# Patient Record
Sex: FEMALE | Employment: FULL TIME | URBAN - METROPOLITAN AREA
[De-identification: names, ages, dates, MRNs, and addresses within clinical notes are randomized per-mention and may not be internally consistent; named-entity substitution may affect disease eponyms.]

---

## 2024-07-22 ENCOUNTER — OFFICE VISIT (OUTPATIENT)
Age: 24
End: 2024-07-22

## 2024-07-22 VITALS
SYSTOLIC BLOOD PRESSURE: 108 MMHG | OXYGEN SATURATION: 100 % | HEART RATE: 100 BPM | TEMPERATURE: 100.5 F | DIASTOLIC BLOOD PRESSURE: 69 MMHG | WEIGHT: 128 LBS

## 2024-07-22 DIAGNOSIS — K04.7 DENTAL ABSCESS: Primary | ICD-10-CM

## 2024-07-22 DIAGNOSIS — R50.9 FEVER, UNSPECIFIED FEVER CAUSE: ICD-10-CM

## 2024-07-22 LAB
GROUP A STREP ANTIGEN, POC: NEGATIVE
Lab: NORMAL
PERFORMING INSTRUMENT: NORMAL
QC PASS/FAIL: NORMAL
SARS-COV-2, POC: NORMAL
VALID INTERNAL CONTROL, POC: YES

## 2024-07-22 RX ORDER — CEFDINIR 300 MG/1
300 CAPSULE ORAL 2 TIMES DAILY
Qty: 14 CAPSULE | Refills: 0 | Status: SHIPPED | OUTPATIENT
Start: 2024-07-22 | End: 2024-07-29

## 2024-07-22 RX ORDER — COPPER 313.4 MG/1
1 INTRAUTERINE DEVICE INTRAUTERINE ONCE
COMMUNITY

## 2024-07-22 NOTE — PATIENT INSTRUCTIONS
Patient was seen today for sore throat, fevers, chills, dental pain and lymph node swelling  Negative strep and COVID test in clinic today  I do see some evidence of possible dental abscess on the right lower jaw  Will treat with cefdinir twice daily for 7 days due to penicillin allergy as a child  Tylenol and ibuprofen over-the-counter as needed for pain  Warm salt water gargles, hot tea with honey, throat lozenges  Lots of fluids, plenty of rest  Monitor symptoms carefully, go to the emergency department with any difficulty breathing or swallowing, or if symptoms are acutely worsening

## 2024-07-22 NOTE — PROGRESS NOTES
Jenelle Luna (:  2000) is a 23 y.o. female,New patient, here for evaluation of the following chief complaint(s):  Pharyngitis (Sore throat, body aches, chills X today at [age] Am )      Assessment & Plan :  Visit Diagnoses and Associated Orders       Dental abscess    -  Primary    cefdinir (OMNICEF) 300 MG capsule [33420]           Fever, unspecified fever cause        AMB POC RAPID STREP A [29444 CPT(R)]      POCT COVID-19, Antigen [HEI839 Custom]           ORDERS WITHOUT AN ASSOCIATED DIAGNOSIS    Paragard Intrauterine Copper IUD [6040]            Patient was seen today for sore throat, fevers, chills, dental pain and lymph node swelling  Negative strep and COVID test in clinic today  I do see some evidence of possible dental abscess on the right lower jaw  Will treat with cefdinir twice daily for 7 days due to penicillin allergy as a child  Tylenol and ibuprofen over-the-counter as needed for pain  Warm salt water gargles, hot tea with honey, throat lozenges  Lots of fluids, plenty of rest  Monitor symptoms carefully, go to the emergency department with any difficulty breathing or swallowing, or if symptoms are acutely worsening       Subjective :    Pharyngitis       23 y.o. female presents with symptoms of fevers, chills, sore throat and swollen lymph node that began just today.  Additionally she reports some accompanying fatigue and bodyaches.  Denies ear pain, significant nasal congestion or drainage.  No cough or chest congestion.  No shortness of breath or wheezing.  No chest or abdominal pain, no nausea, vomiting or diarrhea.  No rashes noted.  She denies any known sick contacts.  Has not tested herself for COVID.         Vitals:    24 1747   BP: 108/69   Site: Right Upper Arm   Position: Sitting   Cuff Size: Medium Adult   Pulse: 100   Temp: (!) 100.5 °F (38.1 °C)   SpO2: 100%   Weight: 58.1 kg (128 lb)       Results for orders placed or performed in visit on 24   AMB POC